# Patient Record
(demographics unavailable — no encounter records)

---

## 2024-11-04 NOTE — HISTORY OF PRESENT ILLNESS
[de-identified] : 38 year old  male patient with history of stable Hypertension, Herpes Genitalis, Abnormal LFTs, Vitamin D Deficiency, Elevated Hemoglobin A1c, Reactive Airway Disease, history as stated, presented for follow up examination. Patient is non-compliant with all medications at times, as counseled. ROS as stated.

## 2024-11-04 NOTE — HEALTH RISK ASSESSMENT
[No] : In the past 12 months have you used drugs other than those required for medical reasons? No [No falls in past year] : Patient reported no falls in the past year [0] : 2) Feeling down, depressed, or hopeless: Not at all (0) [Current] : Current [de-identified] : None [[id number]] : 0

## 2024-11-04 NOTE — HEALTH RISK ASSESSMENT
[No] : In the past 12 months have you used drugs other than those required for medical reasons? No [No falls in past year] : Patient reported no falls in the past year [0] : 2) Feeling down, depressed, or hopeless: Not at all (0) [Current] : Current [de-identified] : None [[id number]] : 0

## 2024-11-04 NOTE — ASSESSMENT
[FreeTextEntry1] : 38 year old male found to have stable Hypertension, Herpes Genitalis, Abnormal LFTs, Vitamin D Deficiency, Elevated Hemoglobin A1c, Reactive Airway Disease, Eczema, with the current prescription regimen as recommended, diet and life style modifications, as counseled. Prior results reviewed, interpreted and discussed with the patient during today's examination, as appropriate. Follow up, treatment plan and tests, as ordered.  Total time spent : 30 minutes Including: Preparation prior to visit - Reviewing prior record, results of tests and Consultation Reports as applicable Conducting an appropriate H & P during today's encounter Appropriate orders for tests, medications and procedures, as applicable Counseling patient  Note completion

## 2024-11-04 NOTE — HISTORY OF PRESENT ILLNESS
[de-identified] : 38 year old  male patient with history of stable Hypertension, Herpes Genitalis, Abnormal LFTs, Vitamin D Deficiency, Elevated Hemoglobin A1c, Reactive Airway Disease, history as stated, presented for follow up examination. Patient is non-compliant with all medications at times, as counseled. ROS as stated.